# Patient Record
Sex: FEMALE | Race: BLACK OR AFRICAN AMERICAN | NOT HISPANIC OR LATINO | URBAN - NONMETROPOLITAN AREA
[De-identification: names, ages, dates, MRNs, and addresses within clinical notes are randomized per-mention and may not be internally consistent; named-entity substitution may affect disease eponyms.]

---

## 2020-05-26 ENCOUNTER — IMPORTED ENCOUNTER (OUTPATIENT)
Dept: URBAN - NONMETROPOLITAN AREA CLINIC 1 | Facility: CLINIC | Age: 50
End: 2020-05-26

## 2020-05-26 PROBLEM — H52.223: Noted: 2020-05-26

## 2020-05-26 PROBLEM — H52.03: Noted: 2020-05-26

## 2020-05-26 PROCEDURE — 92015 DETERMINE REFRACTIVE STATE: CPT

## 2020-05-26 PROCEDURE — 92004 COMPRE OPH EXAM NEW PT 1/>: CPT

## 2021-08-04 ENCOUNTER — IMPORTED ENCOUNTER (OUTPATIENT)
Dept: URBAN - NONMETROPOLITAN AREA CLINIC 1 | Facility: CLINIC | Age: 51
End: 2021-08-04

## 2021-08-04 PROCEDURE — 92015 DETERMINE REFRACTIVE STATE: CPT

## 2021-08-04 PROCEDURE — 92014 COMPRE OPH EXAM EST PT 1/>: CPT

## 2021-08-04 NOTE — PATIENT DISCUSSION
Hyperopia-Discussed diagnosis with patient. Astigmatism-Discussed diagnosis with patient. Updated spec Rx given. Recommend lens that will provide comfort as well as protect safety and health of eyes. CL wear-CLs fit and center well.-Stressed that patient should not sleep in CL. -Updated CL Rx given. -CL care and precautions given. ORDER PROCLEAR MF TORIC AND PT TO PICKUP AND TRY

## 2022-04-09 ASSESSMENT — VISUAL ACUITY
OD_SC: 20/25
OS_SC: 20/20
OS_CC: J1+
OS_SC: 20/20-1
OD_SC: 20/20-2
OD_CC: J1+

## 2022-04-09 ASSESSMENT — TONOMETRY
OS_IOP_MMHG: 13
OS_IOP_MMHG: 14
OD_IOP_MMHG: 14
OD_IOP_MMHG: 13

## 2022-10-26 ENCOUNTER — ESTABLISHED PATIENT (OUTPATIENT)
Dept: RURAL CLINIC 1 | Facility: CLINIC | Age: 52
End: 2022-10-26

## 2022-10-26 DIAGNOSIS — H52.03: ICD-10-CM

## 2022-10-26 DIAGNOSIS — H52.223: ICD-10-CM

## 2022-10-26 PROCEDURE — 92310-E CONTACT LENS FITTING ESTABLISH PATIENT

## 2022-10-26 PROCEDURE — 92014 COMPRE OPH EXAM EST PT 1/>: CPT

## 2022-10-26 PROCEDURE — 92015 DETERMINE REFRACTIVE STATE: CPT

## 2022-10-26 ASSESSMENT — VISUAL ACUITY
OD_CC: 20/20-2
OS_CC: 20/25-2
OU_CC: 20/20-1
OU_CC: 20/25

## 2022-10-26 ASSESSMENT — TONOMETRY
OD_IOP_MMHG: 15
OS_IOP_MMHG: 15